# Patient Record
Sex: MALE | Employment: OTHER | URBAN - METROPOLITAN AREA
[De-identification: names, ages, dates, MRNs, and addresses within clinical notes are randomized per-mention and may not be internally consistent; named-entity substitution may affect disease eponyms.]

---

## 2017-10-31 ENCOUNTER — HOSPITAL ENCOUNTER (OUTPATIENT)
Dept: OTHER | Age: 77
Discharge: HOME | End: 2017-10-31
Attending: UROLOGY | Admitting: UROLOGY

## 2017-10-31 LAB
BUN BLDV-MCNC: 20 MG/DL (ref 7–20)
CREAT SERPL-MCNC: 0.9 MG/DL (ref 0.8–1.3)
GFR AFRICAN AMERICAN: >60
GFR NON-AFRICAN AMERICAN: >60
PROSTATE SPECIFIC ANTIGEN: 0.05 NG/ML (ref 0–4)

## 2017-11-01 ENCOUNTER — HOSPITAL ENCOUNTER (OUTPATIENT)
Dept: ULTRASOUND IMAGING | Age: 77
Discharge: HOME | End: 2017-11-01
Attending: UROLOGY | Admitting: UROLOGY

## 2017-11-01 DIAGNOSIS — R36.1 HEMATOSPERMIA: ICD-10-CM

## 2017-11-01 DIAGNOSIS — C61 MALIGNANT NEOPLASM OF PROSTATE (HCC): ICD-10-CM

## 2019-09-09 ENCOUNTER — HOSPITAL ENCOUNTER (EMERGENCY)
Age: 79
Discharge: HOME | End: 2019-09-09
Attending: EMERGENCY MEDICINE
Payer: MEDICARE

## 2019-09-09 VITALS
BODY MASS INDEX: 31.5 KG/M2 | HEART RATE: 80 BPM | TEMPERATURE: 97.7 F | DIASTOLIC BLOOD PRESSURE: 66 MMHG | OXYGEN SATURATION: 94 % | HEIGHT: 70 IN | SYSTOLIC BLOOD PRESSURE: 139 MMHG | WEIGHT: 220 LBS | RESPIRATION RATE: 16 BRPM

## 2019-09-09 DIAGNOSIS — B36.9 FUNGAL DERMATITIS: Primary | ICD-10-CM

## 2019-09-09 PROCEDURE — 99282 EMERGENCY DEPT VISIT SF MDM: CPT

## 2019-09-09 NOTE — ED PROVIDER NOTES
(TYLENOL) 325 MG TABLET    Take 650 mg by mouth    ATORVASTATIN (LIPITOR) 40 MG TABLET    Take 40 mg by mouth    LISINOPRIL (PRINIVIL;ZESTRIL) 10 MG TABLET    Take 10 mg by mouth         ALLERGIES     Codeine    FAMILYHISTORY       Family History   Problem Relation Age of Onset    Cancer Father           SOCIAL HISTORY       Social History     Socioeconomic History    Marital status: Single     Spouse name: None    Number of children: None    Years of education: None    Highest education level: None   Occupational History    None   Social Needs    Financial resource strain: None    Food insecurity:     Worry: None     Inability: None    Transportation needs:     Medical: None     Non-medical: None   Tobacco Use    Smoking status: Never Smoker    Smokeless tobacco: Never Used   Substance and Sexual Activity    Alcohol use: No    Drug use: No    Sexual activity: None   Lifestyle    Physical activity:     Days per week: None     Minutes per session: None    Stress: None   Relationships    Social connections:     Talks on phone: None     Gets together: None     Attends Muslim service: None     Active member of club or organization: None     Attends meetings of clubs or organizations: None     Relationship status: None    Intimate partner violence:     Fear of current or ex partner: None     Emotionally abused: None     Physically abused: None     Forced sexual activity: None   Other Topics Concern    None   Social History Narrative    None       SCREENINGS             PHYSICAL EXAM    (up to 7 for level 4, 8 or more for level 5)     ED Triage Vitals [09/09/19 1913]   BP Temp Temp src Pulse Resp SpO2 Height Weight   139/66 97.7 °F (36.5 °C) -- 80 16 94 % 5' 10\" (1.778 m) 220 lb (99.8 kg)       Physical Exam     General Appearance:  Alert, cooperative, no distress, appears stated age. Head:  Normocephalic, without obvious abnormality, atraumatic. Eyes:  conjunctiva/corneas clear, EOM's intact. Sclera anicteric. ENT: Mucous membranes moist.   Neck: Supple, symmetrical, trachea midline, no adenopathy. No jugular venous distention. Lungs:   No Respiratory Distress. Chest Wall:     Heart:  RRR no m/c/g/r   Abdomen:   Soft nt nd   Extremities:  Full range of motion. Pulses: Symmetric to all extr   Skin:  His left lower extremity has some erythematous patchy plaques and an irregular pattern with irregular margins on each plaque. This appears to be consistent with fungal dermatitis. Neurologic: Alert and oriented X 3. Motor grossly normal.  Speech clear. DIAGNOSTIC RESULTS   LABS:    Labs Reviewed - No data to display    All other labs were within normal range or not returned as of this dictation. EKG: All EKG's are interpreted by the Emergency Department Physician in the absence of a cardiologist.  Please see their note for interpretation of EKG. RADIOLOGY:   Non-plain film images such as CT, Ultrasound and MRI are read by the radiologist. Plain radiographic images are visualized andpreliminarily interpreted by the  ED Provider with the below findings:        Interpretation perthe Radiologist below, if available at the time of this note:    No orders to display     No results found. PROCEDURES   Unless otherwise noted below, none     Procedures    CRITICAL CARE TIME   N/A    CONSULTS:  None      EMERGENCY DEPARTMENT COURSE and DIFFERENTIAL DIAGNOSIS/MDM:   Vitals:    Vitals:    09/09/19 1913   BP: 139/66   Pulse: 80   Resp: 16   Temp: 97.7 °F (36.5 °C)   SpO2: 94%   Weight: 220 lb (99.8 kg)   Height: 5' 10\" (1.778 m)       Patient was given thefollowing medications:  Medications - No data to display    Briefly this is a well-appearing 71-year-old male no acute distress here with concern for sialitis to his left lower extremity. Examination he does not cellulitis appears to have a fungal dermatitis of his left leg.   He is been on 4 days of ketoconazole which is not sufficient for test of cure. I told to stop the steroid cream and continue to stay the antifungal.  He has no systemic symptoms to suggest cellulitis no fevers chills sweats palpitations dyspnea nausea vomiting diarrhea; normal vital signs here. FINAL IMPRESSION      1.  Fungal dermatitis          DISPOSITION/PLAN   DISPOSITION Decision To Discharge 09/09/2019 07:38:04 PM      PATIENT REFERREDTO:  Norberto Barksdale MD  [address] [place] [address]  [id number]  [place] [id number]  [phone]    Call today        DISCHARGE MEDICATIONS:  New Prescriptions    No medications on file       DISCONTINUED MEDICATIONS:  Discontinued Medications    No medications on file              (Please note that portions ofthis note were completed with a voice recognition program.  Efforts were made to edit the dictations but occasionally words are mis-transcribed.)    Aziza Painter MD (electronically signed)          Aziza Painter MD  09/09/19 4698

## 2022-04-04 ENCOUNTER — OFFICE VISIT (OUTPATIENT)
Dept: CARDIOLOGY CLINIC | Age: 82
End: 2022-04-04
Payer: MEDICARE

## 2022-04-04 VITALS
DIASTOLIC BLOOD PRESSURE: 70 MMHG | SYSTOLIC BLOOD PRESSURE: 122 MMHG | BODY MASS INDEX: 33.36 KG/M2 | HEART RATE: 85 BPM | OXYGEN SATURATION: 95 % | WEIGHT: 233 LBS | HEIGHT: 70 IN

## 2022-04-04 DIAGNOSIS — R01.1 MURMUR: Primary | ICD-10-CM

## 2022-04-04 DIAGNOSIS — E78.2 MIXED HYPERLIPIDEMIA: ICD-10-CM

## 2022-04-04 DIAGNOSIS — E66.3 OVER WEIGHT: ICD-10-CM

## 2022-04-04 DIAGNOSIS — R42 DIZZINESS: ICD-10-CM

## 2022-04-04 PROCEDURE — 4040F PNEUMOC VAC/ADMIN/RCVD: CPT | Performed by: INTERNAL MEDICINE

## 2022-04-04 PROCEDURE — 93000 ELECTROCARDIOGRAM COMPLETE: CPT | Performed by: INTERNAL MEDICINE

## 2022-04-04 PROCEDURE — G8417 CALC BMI ABV UP PARAM F/U: HCPCS | Performed by: INTERNAL MEDICINE

## 2022-04-04 PROCEDURE — 93225 XTRNL ECG REC<48 HRS REC: CPT | Performed by: INTERNAL MEDICINE

## 2022-04-04 PROCEDURE — 99204 OFFICE O/P NEW MOD 45 MIN: CPT | Performed by: INTERNAL MEDICINE

## 2022-04-04 PROCEDURE — 1036F TOBACCO NON-USER: CPT | Performed by: INTERNAL MEDICINE

## 2022-04-04 PROCEDURE — G8427 DOCREV CUR MEDS BY ELIG CLIN: HCPCS | Performed by: INTERNAL MEDICINE

## 2022-04-04 PROCEDURE — 1123F ACP DISCUSS/DSCN MKR DOCD: CPT | Performed by: INTERNAL MEDICINE

## 2022-04-04 RX ORDER — ATORVASTATIN CALCIUM 40 MG/1
TABLET, FILM COATED ORAL
COMMUNITY
Start: 2022-03-25 | End: 2022-05-13 | Stop reason: ALTCHOICE

## 2022-04-04 RX ORDER — FERROUS SULFATE 325(65) MG
325 TABLET ORAL
COMMUNITY
End: 2022-05-13 | Stop reason: ALTCHOICE

## 2022-04-04 NOTE — LETTER
[address] [place] [address] Cardiology [address] [place] [address]  [address]  Phone: [phone]  Fax: [address], DO    April 5, 2022     Omar Cain MD  [address] [place], [place] [id number]  Renetta Gutierrez [address]    Patient: Salli Brittle   MR Number: [id number]   YOB: 1940   Date of Visit: 4/4/2022       Dear [facility]: Thank you for referring Duyen Aguayo to me for evaluation/treatment. Below are the relevant portions of my assessment and plan of care. If you have questions, please do not hesitate to call me. I look forward to following Jasmyne Parra along with you.     Sincerely,      Adalid Cronin, DO

## 2022-04-04 NOTE — PROGRESS NOTES
[address]  4/4/22  Referring: Dr. Vásquez Child CONSULT/CHIEF COMPLAINT/HPI     Reason for visit/ Chief complaint  New Patient  Dizziness, murmur noted per PCP   HPI Kallie Ross is a 81 y.o. seen as a new patient in consult with Dr Lizett Wallace for systolic murmur and dizziness     He has a history of hyperlipidemia and being overweight, appendix removal, bilateral knee replacement, arthritis in feet and prostate cancer (8 years ago - did not receive chemotherapy). Mom passed of \"old age\", dad passed of prostate cancer. He is a , nondrinker,nonsmoker. Today, he is here for a new patient appointment. 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 He keeps busy golfing and bowling with friends. He was referred here by provider Kiara Patino due to \"slight\" heart murmur. He states he feels \"great\" now. He goes to gym every morning and rides 5 miles on stationary bike. About 8 weeks ago in mid of night, he felt twinge in back of head and reports \"spinning\" around (last about 1 minute), during the day no symptoms, then it recurred 2 more nights then no symptoms since. He denies exertional chest pain, CASTLE/PND, palpitations, light-headedness, edema. Pt feels refreshed after sleep and does not take naps. Pt was taken off BP medications 3 years ago. Pt eats fast food sometimes and cooks at home sometimes. Pt has noted edema at night time in LEs, he wears compression stockings. Patient is adherent with medications and is tolerating them well without side effects     HISTORY/ALLERGIES/ROS     MedHx:  has a past medical history of Hypertension and Prostate cancer ([address] SurgHx:  has a past surgical history that includes joint replacement (Bilateral); Cholecystectomy; and Appendectomy. SocHx:  reports that he has never smoked.  He has never used smokeless tobacco. He reports that he does not drink alcohol. FamHx: No family history of premature coronary artery disease, sudden death, or aneurysm  Allergies: Codeine   ROS:   Review of Systems   Constitutional: Positive for fatigue. Negative for activity change, diaphoresis and fever. HENT: Negative for congestion and ear discharge. Eyes: Negative for photophobia and visual disturbance. Respiratory: Negative for cough, chest tightness and shortness of breath. Cardiovascular: Positive for palpitations and leg swelling. Negative for chest pain. Gastrointestinal: Negative for abdominal distention, abdominal pain, blood in stool and nausea. Endocrine: Negative for cold intolerance and polydipsia. Genitourinary: Negative for difficulty urinating and flank pain. Musculoskeletal: Positive for arthralgias and myalgias. Skin: Negative for rash and wound. Allergic/Immunologic: Negative for environmental allergies and immunocompromised state. Neurological: Positive for dizziness. Negative for headaches. Hematological: Negative for adenopathy. Does not bruise/bleed easily. Psychiatric/Behavioral: Negative for confusion. The patient is not hyperactive. MEDICATIONS      Prior to Admission medications    Medication Sig Start Date End Date Taking? Authorizing Provider   ferrous sulfate (IRON 325) 325 (65 Fe) MG tablet Take 325 mg by mouth Taking one every other day.    Yes Historical Provider, MD   atorvastatin (LIPITOR) 40 MG tablet  3/25/22   Historical Provider, MD       PHYSICAL EXAM        Vitals:    04/04/22 1328   BP: 122/70   Pulse: 85   SpO2: 95%    Weight: 233 lb (105.7 kg)     Gen Alert, cooperative, no distress Heart  Regular rate and rhythm, 2/6 systolic murmur 2nd rics   Head Normocephalic, atraumatic, no abnormalities Abd  Soft, NT, +BS, no mass, no OM   Eyes PERRLA, conj/corn clear Ext  Ext nl, AT, no C/C,  Edema present   Nose Nares normal, no drain age, Non-tender Pulse 2+ and symmetric Throat Lips, mucosa, tongue normal Skin Color/text/turg nl, no rash/lesions   Neck S/S, TM, NT, no bruit Psych Nl mood and affect   Lung CTA-B, unlabored, no DTP     Ch wall NT, no deform       LABS and Imaging     Relevant and available CV data reviewed  Echo/MRI: none   Cath: none  Holter:none  EKG personally interpreted: SR 1st degree AVB  Stress:none  Moderate Risk  Moderate Complexity/Medical Decision Making  Outside/Care everywhere records Reviewed  Labs Reviewed  Prior Imaging, ekg, cath, echo reviewed when available  Medications reviewed  Old Notes reviewed  ASSESSMENT AND PLAN     1.systolic murmur  - new problem  - no history of endocarditis   Plan:   echocardiogram    2. Dizziness  - new problem  - differential: vertigo, sleep apnea, arrhythmia  Plan:   48 hour monitor to assess for arrhythmia    3. Mixed hyperlipidemia  -  3/31/2022  Tc 114  hdl 35 ldl 59 tri 105  -stale  -  On lipitor  Plan:   continue lipitor    4. Overweight  3/31  Hg a1c 5.6 tsh 2.730  Pt has lost 10 pounds by not drinking Pepsi  Plan: pt states goal weight is 200 lbs and will try to achieve goal        Plan:    Patient counseled on lifestyle modification, diet, and exercise. Follow Up: 1 month   Continue with same medications        Scribe's attestation: This note was scribed in the presence of Dr Kandi Matute by Davide Kilpatrick, PARESH. Dr. Alma Perkins    Physician Attestation  The scribe for and in the presence of gary Alvarado DO). The scribe Kristy Agent may have prepopulated components of this note with my historical  intellectual property under my direct supervision. Any additions to this intellectual property were performed in my presence and at my direction.   Furthermore, the content and accuracy of this note have been reviewed by gary Alvarado DO).  4/4/2022 1:59 PM

## 2022-04-04 NOTE — PATIENT INSTRUCTIONS
Patient counseled on lifestyle modification, diet, and exercise.   Follow Up: 1 month   Continue with same medications

## 2022-04-05 ASSESSMENT — ENCOUNTER SYMPTOMS
ABDOMINAL DISTENTION: 0
BLOOD IN STOOL: 0
ABDOMINAL PAIN: 0
CHEST TIGHTNESS: 0
SHORTNESS OF BREATH: 0
NAUSEA: 0
PHOTOPHOBIA: 0
COUGH: 0

## 2022-04-08 PROCEDURE — 93227 XTRNL ECG REC<48 HR R&I: CPT | Performed by: INTERNAL MEDICINE

## 2022-04-29 ENCOUNTER — TELEPHONE (OUTPATIENT)
Dept: CARDIOLOGY CLINIC | Age: 82
End: 2022-04-29

## 2022-05-13 ENCOUNTER — OFFICE VISIT (OUTPATIENT)
Dept: CARDIOLOGY CLINIC | Age: 82
End: 2022-05-13
Payer: MEDICARE

## 2022-05-13 ENCOUNTER — HOSPITAL ENCOUNTER (OUTPATIENT)
Dept: NON INVASIVE DIAGNOSTICS | Age: 82
Discharge: HOME | End: 2022-05-13
Payer: MEDICARE

## 2022-05-13 VITALS
BODY MASS INDEX: 32.07 KG/M2 | HEIGHT: 70 IN | HEART RATE: 61 BPM | DIASTOLIC BLOOD PRESSURE: 80 MMHG | WEIGHT: 224 LBS | OXYGEN SATURATION: 96 % | SYSTOLIC BLOOD PRESSURE: 122 MMHG

## 2022-05-13 DIAGNOSIS — R01.1 MURMUR: ICD-10-CM

## 2022-05-13 DIAGNOSIS — E66.3 OVER WEIGHT: ICD-10-CM

## 2022-05-13 DIAGNOSIS — I35.0 AORTIC VALVE STENOSIS, ETIOLOGY OF CARDIAC VALVE DISEASE UNSPECIFIED: Primary | ICD-10-CM

## 2022-05-13 DIAGNOSIS — R42 DIZZINESS: ICD-10-CM

## 2022-05-13 DIAGNOSIS — E78.2 MIXED HYPERLIPIDEMIA: ICD-10-CM

## 2022-05-13 LAB
LV EF: 55 %
LVEF MODALITY: NORMAL

## 2022-05-13 PROCEDURE — 4040F PNEUMOC VAC/ADMIN/RCVD: CPT | Performed by: INTERNAL MEDICINE

## 2022-05-13 PROCEDURE — G8427 DOCREV CUR MEDS BY ELIG CLIN: HCPCS | Performed by: INTERNAL MEDICINE

## 2022-05-13 PROCEDURE — 93306 TTE W/DOPPLER COMPLETE: CPT

## 2022-05-13 PROCEDURE — 99214 OFFICE O/P EST MOD 30 MIN: CPT | Performed by: INTERNAL MEDICINE

## 2022-05-13 PROCEDURE — G8417 CALC BMI ABV UP PARAM F/U: HCPCS | Performed by: INTERNAL MEDICINE

## 2022-05-13 PROCEDURE — 1123F ACP DISCUSS/DSCN MKR DOCD: CPT | Performed by: INTERNAL MEDICINE

## 2022-05-13 PROCEDURE — 1036F TOBACCO NON-USER: CPT | Performed by: INTERNAL MEDICINE

## 2022-05-13 ASSESSMENT — ENCOUNTER SYMPTOMS
CHEST TIGHTNESS: 0
COUGH: 0
ABDOMINAL DISTENTION: 0
ABDOMINAL PAIN: 0
BLOOD IN STOOL: 0
SHORTNESS OF BREATH: 0
PHOTOPHOBIA: 0
NAUSEA: 0

## 2022-05-13 NOTE — PATIENT INSTRUCTIONS
Continue lipitor  Echo showed mild Aortic stenosis  Please call if any change in symptoms  150 minutes of exercise a week-   Recommend nuts- pecans, almonds walnuts brazillian nuts  Weight goal of 200

## 2022-05-13 NOTE — PROGRESS NOTES
[address]  5/13/22  Referring: Dr. Pebbles Asif CONSULT/CHIEF COMPLAINT/HPI     Reason for visit/ Chief complaint  Follow up  Echo to evaluate murmer   HPI Panda Hare is a 81 y.o. seen as a follow up with an echo to evaluate murmer heard on exam.  He has a history of hyperlipidemia and being overweight, appendix removal, bilateral knee replacement, arthritis in feet and prostate cancer (8 years ago - did not receive chemotherapy). Mom passed of \"old age\", dad passed of prostate cancer. He is a , nondrinker,nonsmoker. 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 In the interval since his last visit, he had a monitor to evaluate one episode of dizziness. This showed NSR with short episodes of PAT,no further dizziness    Today, his echo showed calcified aortic valve with mild AS. He still works out every day, rides a recumbent bike 5 miles a day. He has no chest pain, shortness of breath palpitations. No dizziness or syncope. Has had one episode of dizziness in the past year, no recurrence. No edema or orthopnea      Patient is adherent with medications and is tolerating them well without side effects     HISTORY/ALLERGIES/ROS     MedHx:  has a past medical history of Cancer ([address] Hypertension, and Pro[address] cancer ([facility] [address] SurgHx:  has a past surgical history that includes knee surgery; Abdomen surgery; Tonsillectomy; skin biopsy; Colonoscopy; Cholecystectomy, laparoscopic (3/27/2012); joint replacement (Bilateral); Cholecystectomy; and Appendectomy. SocHx:  reports that he has never smoked. He has never used smokeless tobacco. He reports that he does not drink alcohol and does not use drugs.    FamHx: No family history of premature coronary artery disease, sudden death, or aneurysm  Allergies: Codeine   ROS:   Review of Systems   Constitutional: Positive for fatigue. Negative for activity change, diaphoresis and fever. HENT: Negative for congestion and ear discharge. Eyes: Negative for photophobia and visual disturbance. Respiratory: Negative for cough, chest tightness and shortness of breath. Cardiovascular: Positive for palpitations and leg swelling. Negative for chest pain. Gastrointestinal: Negative for abdominal distention, abdominal pain, blood in stool and nausea. Endocrine: Negative for cold intolerance and polydipsia. Genitourinary: Negative for difficulty urinating and flank pain. Musculoskeletal: Positive for arthralgias and myalgias. Skin: Negative for rash and wound. Allergic/Immunologic: Negative for environmental allergies and immunocompromised state. Neurological: Positive for dizziness. Negative for headaches. Hematological: Negative for adenopathy. Does not bruise/bleed easily. Psychiatric/Behavioral: Negative for confusion. The patient is not hyperactive. MEDICATIONS      Prior to Admission medications    Medication Sig Start Date End Date Taking?  Authorizing Provider   acetaminophen (TYLENOL) 325 MG tablet Take 650 mg by mouth   Yes Historical Provider, MD   atorvastatin (LIPITOR) 40 MG tablet Take 40 mg by mouth   Yes Historical Provider, MD       PHYSICAL EXAM        Vitals:    05/13/22 0814   BP: 122/80   Pulse: 61   SpO2: 96%    Weight: 224 lb (101.6 kg)     Gen Alert, cooperative, no distress Heart  Regular rate and rhythm, 2/6 systolic murmur 2nd rics   Head Normocephalic, atraumatic, no abnormalities Abd  Soft, NT, +BS, no mass, no OM   Eyes PERRLA, conj/corn clear Ext  Ext nl, AT, no C/C, bilateral knee replacement,  Edema present   Nose Nares normal, no drain age, Non-tender Pulse 2+ and symmetric   Throat Lips, mucosa, tongue normal Skin Color/text/turg nl, no rash/lesions   Neck S/S, TM, NT, no bruit Psych Nl mood and affect   Lung CTA-B, unlabored, no DTP     Ch wall NT, no deform       LABS and Imaging     Relevant and available CV data reviewed  Echo/MRI: 5/13/2022-  Cath: none  Holter4/13/22   NSR short PAT  EKG personally interpreted: SR 1st degree AVB  Stress:none  Moderate Risk  Moderate Complexity/Medical Decision Making  Outside/Care everywhere records Reviewed  Labs Reviewed  Prior Imaging, ekg, cath, echo reviewed when available  Medications reviewed  Old Notes reviewed  ASSESSMENT AND PLAN     1. AS  - 2/6 systolic murmer  - new problem  - no history of endocarditis   Plan:   - echo today  - Instructed to call for change in symptoms      2 . Mixed hyperlipidemia  -  3/31/2022  Tc 114  hdl 35 ldl 59 tri 105  -  stable  Plan:   -   continue lipitor    3 overweight  -  3/31  Hg a1c 5.6 tsh 2.730  -  Pt has lost 10 pounds by not drinking Pepsi  Plan  -  pt states goal weight is 200 lbs and will try to achieve goal    Plan:  - Patient counseled on lifestyle modification, diet, and exercise. - Follow Up:   - One year  Continue with same medications        Scribe's attestation: This note was scribed in the presence of Dr Marysol Dorsey by Coy Gamboa  Cardiologist [facility] [place]    Physician Attestation  The scribe for and in the presence of gary Joe DO). The scribe Shad Miller may have prepopulated components of this note with my historical  intellectual property under my direct supervision. Any additions to this intellectual property were performed in my presence and at my direction.   Furthermore, the content and accuracy of this note have been reviewed by gary Joe DO).  5/13/2022 8:27 AM

## 2022-05-13 NOTE — LETTER
[address] Cardiology [place] [address] [address] [address]  Phone: [phone]  Fax: [address], DO    May 13, 2022     Swati Riley MD  [address] [place] [address] [id number]  P[place] [phone]    Patient: Hawk Bound   MR Number: [id number]   YOB: 1940   Date of Visit: 5/13/2022       Dear [facility]: Thank you for referring Bessie Buster to me for evaluation/treatment. Below are the relevant portions of my assessment and plan of care. If you have questions, please do not hesitate to call me. I look forward to following Marii Charles along with you.     Sincerely,      [facility], DO

## 2023-10-15 ENCOUNTER — APPOINTMENT (OUTPATIENT)
Dept: CT IMAGING | Age: 83
End: 2023-10-15
Payer: MEDICARE

## 2023-10-15 ENCOUNTER — HOSPITAL ENCOUNTER (EMERGENCY)
Age: 83
Discharge: HOME | End: 2023-10-15
Attending: EMERGENCY MEDICINE
Payer: MEDICARE

## 2023-10-15 VITALS
DIASTOLIC BLOOD PRESSURE: 53 MMHG | HEART RATE: 69 BPM | SYSTOLIC BLOOD PRESSURE: 149 MMHG | OXYGEN SATURATION: 96 % | RESPIRATION RATE: 18 BRPM | TEMPERATURE: 97.4 F

## 2023-10-15 DIAGNOSIS — K59.00 CONSTIPATION, UNSPECIFIED CONSTIPATION TYPE: ICD-10-CM

## 2023-10-15 DIAGNOSIS — B02.9 HERPES ZOSTER WITHOUT COMPLICATION: Primary | ICD-10-CM

## 2023-10-15 LAB
ALBUMIN SERPL-MCNC: 3.9 G/DL (ref 3.4–5)
ALBUMIN/GLOB SERPL: 1.3 {RATIO} (ref 1.1–2.2)
ALP SERPL-CCNC: 84 U/L (ref 40–129)
ALT SERPL-CCNC: 24 U/L (ref 10–40)
ANION GAP SERPL CALCULATED.3IONS-SCNC: 11 MMOL/L (ref 3–16)
AST SERPL-CCNC: 23 U/L (ref 15–37)
BASOPHILS # BLD: 0.3 K/UL (ref 0–0.2)
BASOPHILS NFR BLD: 4.4 %
BILIRUB SERPL-MCNC: 0.7 MG/DL (ref 0–1)
BILIRUB UR QL STRIP.AUTO: NEGATIVE
BUN SERPL-MCNC: 23 MG/DL (ref 7–20)
CALCIUM SERPL-MCNC: 9.1 MG/DL (ref 8.3–10.6)
CHLORIDE SERPL-SCNC: 99 MMOL/L (ref 99–110)
CLARITY UR: CLEAR
CO2 SERPL-SCNC: 26 MMOL/L (ref 21–32)
COLOR UR: YELLOW
CREAT SERPL-MCNC: 1.2 MG/DL (ref 0.8–1.3)
DEPRECATED RDW RBC AUTO: 14.1 % (ref 12.4–15.4)
EOSINOPHIL # BLD: 0.3 K/UL (ref 0–0.6)
EOSINOPHIL NFR BLD: 4.3 %
GFR SERPLBLD CREATININE-BSD FMLA CKD-EPI: >60 ML/MIN/{1.73_M2}
GLUCOSE SERPL-MCNC: 117 MG/DL (ref 70–99)
GLUCOSE UR STRIP.AUTO-MCNC: NEGATIVE MG/DL
HCT VFR BLD AUTO: 36.5 % (ref 40.5–52.5)
HGB BLD-MCNC: 12.7 G/DL (ref 13.5–17.5)
HGB UR QL STRIP.AUTO: NEGATIVE
KETONES UR STRIP.AUTO-MCNC: ABNORMAL MG/DL
LEUKOCYTE ESTERASE UR QL STRIP.AUTO: NEGATIVE
LIPASE SERPL-CCNC: 70 U/L (ref 13–60)
LYMPHOCYTES # BLD: 0.8 K/UL (ref 1–5.1)
LYMPHOCYTES NFR BLD: 11.9 %
MCH RBC QN AUTO: 32 PG (ref 26–34)
MCHC RBC AUTO-ENTMCNC: 34.8 G/DL (ref 31–36)
MCV RBC AUTO: 92.1 FL (ref 80–100)
MONOCYTES # BLD: 0.3 K/UL (ref 0–1.3)
MONOCYTES NFR BLD: 4.8 %
NEUTROPHILS # BLD: 5 K/UL (ref 1.7–7.7)
NEUTROPHILS NFR BLD: 74.6 %
NITRITE UR QL STRIP.AUTO: NEGATIVE
PH UR STRIP.AUTO: 5.5 [PH] (ref 5–8)
PLATELET # BLD AUTO: 180 K/UL (ref 135–450)
PMV BLD AUTO: 8.5 FL (ref 5–10.5)
POTASSIUM SERPL-SCNC: 4.2 MMOL/L (ref 3.5–5.1)
PROT SERPL-MCNC: 6.9 G/DL (ref 6.4–8.2)
PROT UR STRIP.AUTO-MCNC: NEGATIVE MG/DL
RBC # BLD AUTO: 3.96 M/UL (ref 4.2–5.9)
SODIUM SERPL-SCNC: 136 MMOL/L (ref 136–145)
SP GR UR STRIP.AUTO: 1.02 (ref 1–1.03)
UA COMPLETE W REFLEX CULTURE PNL UR: ABNORMAL
UA DIPSTICK W REFLEX MICRO PNL UR: ABNORMAL
URN SPEC COLLECT METH UR: ABNORMAL
UROBILINOGEN UR STRIP-ACNC: 1 E.U./DL
WBC # BLD AUTO: 6.7 K/UL (ref 4–11)

## 2023-10-15 PROCEDURE — 74177 CT ABD & PELVIS W/CONTRAST: CPT

## 2023-10-15 PROCEDURE — 83690 ASSAY OF LIPASE: CPT

## 2023-10-15 PROCEDURE — 85025 COMPLETE CBC W/AUTO DIFF WBC: CPT

## 2023-10-15 PROCEDURE — 80053 COMPREHEN METABOLIC PANEL: CPT

## 2023-10-15 PROCEDURE — 81003 URINALYSIS AUTO W/O SCOPE: CPT

## 2023-10-15 PROCEDURE — 6360000004 HC RX CONTRAST MEDICATION: Performed by: EMERGENCY MEDICINE

## 2023-10-15 PROCEDURE — 6370000000 HC RX 637 (ALT 250 FOR IP): Performed by: EMERGENCY MEDICINE

## 2023-10-15 PROCEDURE — 99285 EMERGENCY DEPT VISIT HI MDM: CPT

## 2023-10-15 RX ORDER — VALACYCLOVIR HYDROCHLORIDE 1 G/1
1000 TABLET, FILM COATED ORAL 3 TIMES DAILY
Qty: 21 TABLET | Refills: 0 | Status: SHIPPED | OUTPATIENT
Start: 2023-10-15 | End: 2023-10-22

## 2023-10-15 RX ORDER — POLYETHYLENE GLYCOL 3350 17 G/17G
17 POWDER, FOR SOLUTION ORAL DAILY PRN
Qty: 119 G | Refills: 0 | Status: SHIPPED | OUTPATIENT
Start: 2023-10-15 | End: 2023-10-22

## 2023-10-15 RX ORDER — OXYCODONE HYDROCHLORIDE AND ACETAMINOPHEN 5; 325 MG/1; MG/1
1 TABLET ORAL EVERY 6 HOURS PRN
Qty: 12 TABLET | Refills: 0 | Status: SHIPPED | OUTPATIENT
Start: 2023-10-15 | End: 2023-10-18

## 2023-10-15 RX ORDER — ACYCLOVIR 800 MG/1
800 TABLET ORAL ONCE
Status: COMPLETED | OUTPATIENT
Start: 2023-10-15 | End: 2023-10-15

## 2023-10-15 RX ADMIN — ACYCLOVIR 800 MG: 800 TABLET ORAL at 04:54

## 2023-10-15 RX ADMIN — IOPAMIDOL 75 ML: 755 INJECTION, SOLUTION INTRAVENOUS at 04:03

## 2023-10-15 ASSESSMENT — PAIN - FUNCTIONAL ASSESSMENT: PAIN_FUNCTIONAL_ASSESSMENT: 0-10

## 2023-10-15 ASSESSMENT — PAIN SCALES - GENERAL: PAINLEVEL_OUTOF10: 9

## 2023-10-15 NOTE — ED PROVIDER NOTES
Emergency Department Provider Note  [facility] [address]  10/15/2023     Patient Identification  Butch Kang is a 81 y.o. male    Chief Complaint  Abdominal Pain (PT has lower ABD pain that wraps around back and radiates to left shoulder. PT has gallbladder and appendix removed. Pt seen PCP and was given omeprazole w/o relief. Has not had BM in 4 days, vomited once)      Mode of Arrival  private car (patient drove)    HPI  (History provided by patient)  This is a 81 y.o. male with a PMH significant for HTN, prostate cancer presented today for abdominal pain, back pain. Patient said his pain started 4 days ago. He points to periumbilical area and right flank area as location of pain. Patient said his primary care physician gave him omeprazole and he had no relief of his pain. Patient also reported no bowel movement for 4 days. He vomited once. Denied bloody emesis. No fever. Patient also has a separate complaint of left upper arm pain over the bicep area. It started after he lifted several boxes of heavy tiles for his bathroom. He denies numbness or tingling distally. He is able to move his arm without difficulty. He did shoulder pain specifically. No other complaints, modifying factors or associated symptoms. I have reviewed the following nursing documentation:  Allergies: Allergies   Allergen Reactions    Codeine        Past medical history:  has a past medical history of Cancer ([address] Hypertension, and Prostate cancer ([address] Past surgical history:  has a past surgical history that includes knee surgery; Abdomen surgery; Tonsillectomy; skin biopsy; Colonoscopy; Cholecystectomy, laparoscopic (3/27/2012); joint replacement (Bilateral); Cholecystectomy; and Appendectomy. Home medications:   Prior to Admission medications    Medication Sig Start Date End Date Taking?  Authorizing Provider   acetaminophen (TYLENOL) 325 MG tablet Take 650 mg by mouth

## 2023-10-15 NOTE — ED NOTES
Pt discharged back home, reviewed discharged instructions with pt follow up care , pain control and return precautions discussed , pt verbalized understanding. Pt ambulated out of the department with steady gait .           Merlin Barker RN  10/15/23 0457

## 2024-09-30 ENCOUNTER — APPOINTMENT (OUTPATIENT)
Dept: GENERAL RADIOLOGY | Age: 84
End: 2024-09-30
Payer: MEDICARE

## 2024-09-30 ENCOUNTER — HOSPITAL ENCOUNTER (EMERGENCY)
Age: 84
Discharge: HOME | End: 2024-09-30
Payer: MEDICARE

## 2024-09-30 VITALS
WEIGHT: 225 LBS | OXYGEN SATURATION: 94 % | HEART RATE: 70 BPM | BODY MASS INDEX: 32.21 KG/M2 | SYSTOLIC BLOOD PRESSURE: 144 MMHG | HEIGHT: 70 IN | RESPIRATION RATE: 16 BRPM | DIASTOLIC BLOOD PRESSURE: 72 MMHG | TEMPERATURE: 98 F

## 2024-09-30 DIAGNOSIS — M25.432 LEFT WRIST EFFUSION: Primary | ICD-10-CM

## 2024-09-30 PROCEDURE — 99283 EMERGENCY DEPT VISIT LOW MDM: CPT

## 2024-09-30 PROCEDURE — 73110 X-RAY EXAM OF WRIST: CPT

## 2024-09-30 ASSESSMENT — ENCOUNTER SYMPTOMS
RHINORRHEA: 0
SHORTNESS OF BREATH: 0
DIARRHEA: 0
WHEEZING: 0
VOMITING: 0
NAUSEA: 0
COUGH: 0
ABDOMINAL PAIN: 0

## 2024-09-30 ASSESSMENT — PAIN DESCRIPTION - ORIENTATION: ORIENTATION: LEFT

## 2024-09-30 ASSESSMENT — PAIN SCALES - GENERAL: PAINLEVEL_OUTOF10: 6

## 2024-09-30 ASSESSMENT — PAIN DESCRIPTION - LOCATION: LOCATION: WRIST

## 2024-09-30 ASSESSMENT — PAIN DESCRIPTION - DESCRIPTORS: DESCRIPTORS: DISCOMFORT

## 2024-09-30 ASSESSMENT — PAIN - FUNCTIONAL ASSESSMENT: PAIN_FUNCTIONAL_ASSESSMENT: 0-10

## 2024-09-30 NOTE — ED PROVIDER NOTES
[facility] EMERGENCY DEPARTMENT  EMERGENCY DEPARTMENT ENCOUNTER        Pt Name: Chase Quiles  MRN: [id number]  Birthdate 1940  Date of evaluation: 9/30/2024  Provider: India Angel PA-C  PCP: Andrews Leon MD  Note Started: 3:51 PM EDT 9/30/24      OTTONIEL. I have evaluated this patient.        CHIEF COMPLAINT       Chief Complaint   Patient presents with    Wrist Injury     Fall and landed on left wrist.        HISTORY OF PRESENT ILLNESS: 1 or more Elements     History From: patient   Limitations to history : None    Chase Quiles is a 84 y.o. male who presents for evaluation of a left wrist injury.  Patient states that he initially fell 3 weeks ago but it was not bothering him as much until about 3 to 4 days ago.  States that it swelled up and tender.  No numbness tingling or weakness distally.  No other injuries or trauma.  No history of gout.  No fevers.  No other complaints or concerns at this time.    Nursing Notes were all reviewed and agreed with or any disagreements were addressed in the HPI.    REVIEW OF SYSTEMS :      Review of Systems   Constitutional:  Negative for appetite change, chills and fever.   HENT:  Negative for congestion and rhinorrhea.    Respiratory:  Negative for cough, shortness of breath and wheezing.    Cardiovascular:  Negative for chest pain.   Gastrointestinal:  Negative for abdominal pain, diarrhea, nausea and vomiting.   Genitourinary:  Negative for difficulty urinating, dysuria and hematuria.   Musculoskeletal:  Positive for arthralgias (L wrist). Negative for neck pain and neck stiffness.   Skin:  Negative for rash.   Neurological:  Negative for weakness, numbness and headaches.       Positives and Pertinent negatives as per HPI.     SURGICAL HISTORY     Past Surgical History:   Procedure Laterality Date    ABDOMEN SURGERY      APPENDECTOMY      CHOLECYSTECTOMY      CHOLECYSTECTOMY, LAPAROSCOPIC  3/27/2012    with cholangiogram and

## 2024-10-04 ENCOUNTER — OFFICE VISIT (OUTPATIENT)
Dept: ORTHOPEDIC SURGERY | Age: 84
End: 2024-10-04
Payer: MEDICARE

## 2024-10-04 VITALS — BODY MASS INDEX: 32.21 KG/M2 | HEIGHT: 70 IN | WEIGHT: 225 LBS

## 2024-10-04 DIAGNOSIS — M11.232 PSEUDOGOUT OF LEFT WRIST: Primary | ICD-10-CM

## 2024-10-04 DIAGNOSIS — M11.839 CALCIUM PYROPHOSPHATE DEPOSITION DISEASE (CPDD) OF WRIST: ICD-10-CM

## 2024-10-04 PROCEDURE — G8427 DOCREV CUR MEDS BY ELIG CLIN: HCPCS | Performed by: ORTHOPAEDIC SURGERY

## 2024-10-04 PROCEDURE — 99203 OFFICE O/P NEW LOW 30 MIN: CPT | Performed by: ORTHOPAEDIC SURGERY

## 2024-10-04 PROCEDURE — 1123F ACP DISCUSS/DSCN MKR DOCD: CPT | Performed by: ORTHOPAEDIC SURGERY

## 2024-10-04 PROCEDURE — G8417 CALC BMI ABV UP PARAM F/U: HCPCS | Performed by: ORTHOPAEDIC SURGERY

## 2024-10-04 PROCEDURE — G8484 FLU IMMUNIZE NO ADMIN: HCPCS | Performed by: ORTHOPAEDIC SURGERY

## 2024-10-04 PROCEDURE — 1036F TOBACCO NON-USER: CPT | Performed by: ORTHOPAEDIC SURGERY

## 2024-10-04 NOTE — PROGRESS NOTES
file   Social History Narrative    ** Merged History Encounter **          Social Determinants of Health     Financial Resource Strain: Not on file   Food Insecurity: Not on file   Transportation Needs: Not on file   Physical Activity: Not on file   Stress: Not on file   Social Connections: Not on file   Intimate Partner Violence: Not on file   Housing Stability: Not on file        Vitals:    10/04/24 1010   Weight: 102.1 kg (225 lb)   Height: 1.778 m (5' 10\")       Physical Exam  Constitutional - well-groomed, well-nourished, Body mass index is 32.28 kg/m².  Psychiatric - pleasant, normal mood & affect  Cardiovascular - left radial pulse 2+  Respiratory - respirations unlabored, on room air  Skin - no rashes, wounds, or lesions seen on exposed skin  Neurological - YOGESH RAVI M/U/R/A/LABC nerve distributions; AIN/PIN/IO intact  Left hand/wrist -   No obvious deformity or ecchymosis  Mild swelling  No tenderness to palpation over the scaphoid tubercle or anatomic snuffbox  No tenderness to palpation of the distal radius, DRUJ, or distal ulna  Maintained active wrist extension/flexion, active supination/pronation        Imaging:  Images were personally reviewed by myself and discussed with the patient  4 XRAY VIEWS OF THE LEFT WRIST  9/30/2024 2:48 pm  COMPARISON:  None.  HISTORY:  ORDERING SYSTEM PROVIDED HISTORY: fall  TECHNOLOGIST PROVIDED HISTORY:  Reason for exam:->fall  Reason for Exam: fall     FINDINGS:  There is a joint effusion.  There is no definitive acute fracture or  traumatic malalignment.  There is chondrocalcinosis of the radiocarpal joint.  The soft tissues are normal.  The joint spaces are maintained.     IMPRESSION:  1. Joint effusion without definitive evidence of fracture.  In the setting of  trauma this is suspicious for occult fracture and repeat radiographs in 1  week are recommended.  2. CPPD.         Labs:  Lab Results   Component Value Date    WBC 6.7 10/15/2023    HGB 12.7 (L) 10/15/2023